# Patient Record
Sex: MALE | Race: BLACK OR AFRICAN AMERICAN
[De-identification: names, ages, dates, MRNs, and addresses within clinical notes are randomized per-mention and may not be internally consistent; named-entity substitution may affect disease eponyms.]

---

## 2021-05-27 ENCOUNTER — HOSPITAL ENCOUNTER (OUTPATIENT)
Dept: HOSPITAL 63 - US | Age: 51
End: 2021-05-27
Attending: INTERNAL MEDICINE
Payer: COMMERCIAL

## 2021-05-27 DIAGNOSIS — R22.2: Primary | ICD-10-CM

## 2021-05-27 PROCEDURE — 76604 US EXAM CHEST: CPT

## 2021-05-27 NOTE — RAD
EXAM: US CHEST.



HISTORY: Palpable focus left anterior chest wall.



COMPARISON: None.



FINDINGS: Sonographic evaluation of the site of palpable concern along the left anterior chest wall w
as performed. This reveals a small immediately subcutaneous hyperechoic nodule measuring 7 x 6 x 3 mm
. No indication is high. There is no internal perfusion on Doppler.



IMPRESSION: 

1. A 7 mm immediately subcutaneous nodule corresponding with the palpable focus may represent a focus
 of fat necrosis or a small lipoma. Recommend ongoing clinical follow-up of palpable foci to ensure s
tability.



Electronically signed by: KRYSTINA Adkins MD (5/27/2021 9:33 AM) [facility]